# Patient Record
Sex: MALE | Race: BLACK OR AFRICAN AMERICAN | NOT HISPANIC OR LATINO | Employment: FULL TIME | URBAN - METROPOLITAN AREA
[De-identification: names, ages, dates, MRNs, and addresses within clinical notes are randomized per-mention and may not be internally consistent; named-entity substitution may affect disease eponyms.]

---

## 2024-02-09 ENCOUNTER — OFFICE VISIT (OUTPATIENT)
Dept: URGENT CARE | Facility: CLINIC | Age: 44
End: 2024-02-09
Payer: COMMERCIAL

## 2024-02-09 VITALS
DIASTOLIC BLOOD PRESSURE: 86 MMHG | RESPIRATION RATE: 18 BRPM | HEART RATE: 76 BPM | TEMPERATURE: 96.7 F | SYSTOLIC BLOOD PRESSURE: 131 MMHG

## 2024-02-09 DIAGNOSIS — M54.42 ACUTE LEFT-SIDED LOW BACK PAIN WITH LEFT-SIDED SCIATICA: Primary | ICD-10-CM

## 2024-02-09 PROCEDURE — 99203 OFFICE O/P NEW LOW 30 MIN: CPT | Performed by: PHYSICIAN ASSISTANT

## 2024-02-09 RX ORDER — PREDNISONE 10 MG/1
50 TABLET ORAL DAILY
Qty: 25 TABLET | Refills: 0 | Status: SHIPPED | OUTPATIENT
Start: 2024-02-09 | End: 2024-02-14

## 2024-02-09 RX ORDER — METHOCARBAMOL 750 MG/1
750 TABLET, FILM COATED ORAL 4 TIMES DAILY
Qty: 40 TABLET | Refills: 0 | Status: SHIPPED | OUTPATIENT
Start: 2024-02-09 | End: 2024-02-19

## 2024-02-09 ASSESSMENT — PAIN SCALES - GENERAL: PAINLEVEL: 1

## 2024-02-09 NOTE — PROGRESS NOTES
Subjective   Patient ID: Vasu Ortiz Jr. is a 44 y.o. male who presents for Back Pain (PT CO OF CHRONIC BACK PAIN X 15 YEARS).  Acute exacerbation while at work today.  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  He was switched over to different due to the multiple call loss.  This exacerbated his chronic back pain and patient is here area today with request for work note.  The patient denies any fevers or chills associated with the back pain denies any chest pain or shortness of breath associated.  Denies any history of IV drug use.  Denies any history of cancer.  Notes that he is having some pain radiating down the left lower extremity to the posterior thigh and behind the knee.  He notes he is achieved good results with steroids and muscle relaxers in the past and also sometimes uses ibuprofen intermittently for the back pain      The remainder of the systems were reviewed and are negative unless noted above      Objective   /86   Pulse 76   Temp 35.9 °C (96.7 °F)   Resp 18   Physical Exam  Constitutional:       General: He is not in acute distress.     Appearance: Normal appearance. He is not ill-appearing, toxic-appearing or diaphoretic.   HENT:      Head: Normocephalic and atraumatic.      Mouth/Throat:      Mouth: Mucous membranes are moist.      Pharynx: Oropharynx is clear.   Eyes:      Conjunctiva/sclera: Conjunctivae normal.   Cardiovascular:      Rate and Rhythm: Normal rate and regular rhythm.      Heart sounds: No murmur heard.  Pulmonary:      Effort: Pulmonary effort is normal. No respiratory distress.      Breath sounds: Normal breath sounds. No wheezing.   Musculoskeletal:         General: Tenderness present. No swelling, deformity or signs of injury. Normal range of motion.      Cervical back: Normal range of motion and neck supple.      Comments: Tenderness associated to left lower lumbar region.  No tenderness over  the midline spine.  Straight leg raise negative bilaterally   Skin:     General: Skin is warm and dry.      Findings: No erythema or rash.   Neurological:      General: No focal deficit present.      Mental Status: He is alert and oriented to person, place, and time.      Gait: Gait normal.         Assessment/Plan   Problem List Items Addressed This Visit       Acute left-sided low back pain with left-sided sciatica - Primary    Relevant Medications    predniSONE (Deltasone) 10 mg tablet    methocarbamol (Robaxin-750) 750 mg tablet      -Patient with a chronic history of lumbar go with sciatica here today with acute exacerbation.  Sending burst of prednisone and methocarbamol to the patient's pharmacy.  No red flag signs by history or physical exam I do not suspect central cord syndrome, no history to suspect spinal epidural abscess, no features to suggest aortic aneurysm or dissection